# Patient Record
Sex: FEMALE | Race: BLACK OR AFRICAN AMERICAN | URBAN - METROPOLITAN AREA
[De-identification: names, ages, dates, MRNs, and addresses within clinical notes are randomized per-mention and may not be internally consistent; named-entity substitution may affect disease eponyms.]

---

## 2020-06-12 ENCOUNTER — HOSPITAL ENCOUNTER (EMERGENCY)
Age: 20
Discharge: HOME | End: 2020-06-12
Attending: EMERGENCY MEDICINE
Payer: COMMERCIAL

## 2020-06-12 ENCOUNTER — APPOINTMENT (OUTPATIENT)
Dept: GENERAL RADIOLOGY | Age: 20
End: 2020-06-12
Payer: COMMERCIAL

## 2020-06-12 VITALS
WEIGHT: 163.1 LBS | SYSTOLIC BLOOD PRESSURE: 117 MMHG | RESPIRATION RATE: 17 BRPM | BODY MASS INDEX: 22.09 KG/M2 | DIASTOLIC BLOOD PRESSURE: 83 MMHG | HEART RATE: 64 BPM | OXYGEN SATURATION: 99 % | TEMPERATURE: 98.2 F | HEIGHT: 72 IN

## 2020-06-12 LAB
EKG ATRIAL RATE: 70 BPM
EKG DIAGNOSIS: NORMAL
EKG P AXIS: 70 DEGREES
EKG P-R INTERVAL: 178 MS
EKG Q-T INTERVAL: 382 MS
EKG QRS DURATION: 92 MS
EKG QTC CALCULATION (BAZETT): 412 MS
EKG R AXIS: 69 DEGREES
EKG T AXIS: 58 DEGREES
EKG VENTRICULAR RATE: 70 BPM

## 2020-06-12 PROCEDURE — 93005 ELECTROCARDIOGRAM TRACING: CPT | Performed by: EMERGENCY MEDICINE

## 2020-06-12 PROCEDURE — 99285 EMERGENCY DEPT VISIT HI MDM: CPT

## 2020-06-12 PROCEDURE — 71046 X-RAY EXAM CHEST 2 VIEWS: CPT

## 2020-06-12 ASSESSMENT — ENCOUNTER SYMPTOMS
RESPIRATORY NEGATIVE: 1
GASTROINTESTINAL NEGATIVE: 1
EYES NEGATIVE: 1

## 2020-06-12 ASSESSMENT — HEART SCORE: ECG: 0

## 2020-06-12 NOTE — ED PROVIDER NOTES
[address] [facility] 69 ENCOUNTER          ATTENDING PHYSICIAN NOTE       Date of evaluation: 6/12/2020    Chief Complaint     Chest Pain      History of Present Illness     Katalina Haynes is a 22 y.o. female who presents to the emergency department complaining of chest pain. Patient states over the last several days she has been having intermittent episodes of pressure in her chest.  She does not note any inciting relieving factors to these and states they will last from several seconds to several hours at a time. She denies any associated shortness of breath or nausea with these. She states that approximately 5 hours prior to presentation she started developed a sensation of sharp pain in her chest.  She states this pain is been constant. She does not note any inciting relieving factors. She was worried that something may be causing this so came to the emergency department for evaluation. She does report having the pressure in the past but never the sharp pain. She denies any recent fevers or chills. She denies any cough or shortness of breath. She denies any nausea, vomiting, or diarrhea. She denies any swelling or pain in her extremities. She denies any recent travel. She is a non-smoker, has no family cardiac history, and has no history of cocaine use in the past.  Patient does note that she has been under increased stress due to employment issues. Review of Systems     Review of Systems   Constitutional: Negative. HENT: Negative. Eyes: Negative. Respiratory: Negative. Cardiovascular: Positive for chest pain. Negative for leg swelling. Gastrointestinal: Negative. Genitourinary: Negative. Musculoskeletal: Negative. Neurological: Negative. All other systems reviewed and are negative. Past Medical, Surgical, Family, and Social History     She has no past medical history on file. She has no past surgical history on file.   Her family history is not on